# Patient Record
Sex: MALE | Race: BLACK OR AFRICAN AMERICAN | URBAN - METROPOLITAN AREA
[De-identification: names, ages, dates, MRNs, and addresses within clinical notes are randomized per-mention and may not be internally consistent; named-entity substitution may affect disease eponyms.]

---

## 2017-03-08 ENCOUNTER — OFFICE VISIT (OUTPATIENT)
Dept: FAMILY MEDICINE | Facility: CLINIC | Age: 47
End: 2017-03-08
Payer: COMMERCIAL

## 2017-03-08 VITALS — DIASTOLIC BLOOD PRESSURE: 70 MMHG | SYSTOLIC BLOOD PRESSURE: 110 MMHG | TEMPERATURE: 98.3 F

## 2017-03-08 DIAGNOSIS — Z71.84 TRAVEL ADVICE ENCOUNTER: Primary | ICD-10-CM

## 2017-03-08 DIAGNOSIS — Z23 NEED FOR VACCINATION: ICD-10-CM

## 2017-03-08 PROCEDURE — 90715 TDAP VACCINE 7 YRS/> IM: CPT | Performed by: NURSE PRACTITIONER

## 2017-03-08 PROCEDURE — 90686 IIV4 VACC NO PRSV 0.5 ML IM: CPT | Performed by: NURSE PRACTITIONER

## 2017-03-08 PROCEDURE — 90472 IMMUNIZATION ADMIN EACH ADD: CPT | Performed by: NURSE PRACTITIONER

## 2017-03-08 PROCEDURE — 99402 PREV MED CNSL INDIV APPRX 30: CPT | Mod: 25 | Performed by: NURSE PRACTITIONER

## 2017-03-08 PROCEDURE — 90471 IMMUNIZATION ADMIN: CPT | Performed by: NURSE PRACTITIONER

## 2017-03-08 RX ORDER — CIPROFLOXACIN 500 MG/1
500 TABLET, FILM COATED ORAL 2 TIMES DAILY
Qty: 6 TABLET | Refills: 0 | Status: SHIPPED | OUTPATIENT
Start: 2017-03-08 | End: 2017-10-17

## 2017-03-08 RX ORDER — LORAZEPAM 0.5 MG/1
0.25-0.5 TABLET ORAL EVERY 8 HOURS PRN
Qty: 8 TABLET | Refills: 0 | Status: SHIPPED | OUTPATIENT
Start: 2017-03-08

## 2017-03-08 RX ORDER — ATOVAQUONE AND PROGUANIL HYDROCHLORIDE 250; 100 MG/1; MG/1
1 TABLET, FILM COATED ORAL DAILY
Qty: 30 TABLET | Refills: 0 | Status: SHIPPED | OUTPATIENT
Start: 2017-03-08

## 2017-03-08 NOTE — PROGRESS NOTES
Nurse Note      Itinerary:  [facility]      Departure Date: 3/25/17      Return Date: 4/10/17      Length of Trip 3 weeks      Reason for Travel: Business           Urban or rural: both      Accommodations: Hotel        IMMUNIZATION HISTORY  Have you received any immunizations within the past 4 weeks?  No  Have you ever fainted from having your blood drawn or from an injection?  No  Have you ever had a fever reaction to vaccination?  No  Have you ever had any bad reaction or side effect from any vaccination?  No  Have you ever had hepatitis A or B vaccine?  Yes  Do you live (or work closely) with anyone who has AIDS, an AIDS-like condition, any other immune disorder or who is on chemotherapy for cancer?  No  Do you have a family history of immunodeficiency?  No  Have you received any injection of immune globulin or any blood products during the past 12 months?  No    Patient roomed by Willi Byrne  Tez Moore is a 46 year old male seen today with spouse for counsultation for international travel to [facility] for Business.  Patient will be departing in  2 week(s) and staying for   2 week(s) and  traveling alone.      Patient itinerary :  will be in the urban region [facility] which presents risk for Malaria, Yellow Fever, Dengue Fever, Chikungungya,  Trypanosomiasis, Schistosomiasis, Rabies, Ebola, food borne illnesses, motor vehicle accidents, Typhoid, Leishmaniasis and Lassa Fever. exposure.      Patient's activities will include visiting friends and relatives and business.    Patient[place] birth is [facility] [place]    Special medical concerns: leukopenia ( genetic) flight anxiety  Pre-travel questionnaire was completed by patient and reviewed by provider.     Vitals: /70  Temp 98.3  F (36.8  C) (Oral)  BMI= There is no height or weight on file to calculate BMI.    EXAM:  General:  Well-nourished, well-developed in no acute distress.  Appears to be stated age,  interacts appropriately and expresses understanding of information given to patient.    Current Outpatient Prescriptions   Medication Sig Dispense Refill     order for DME Equipment being ordered: knee hinged brace [id number] 1 each 0     diclofenac (VOLTAREN) 75 MG EC tablet Take 1 tablet (75 mg) by mouth 2 times daily as needed for moderate pain 60 tablet 1     NO ACTIVE MEDICATIONS        Patient Active Problem List   Diagnosis     CARDIOVASCULAR SCREENING; LDL GOAL LESS THAN 160     Umbilical hernia     Leukopenia     Positive PPD, treated     No Known Allergies      Immunizations discussed include:   Hepatitis A:  Up to date  Hepatitis B: Up to date per report  Influenza: Ordered/given today, risks, benefits and side effects reviewed  Typhoid: ordered oral  Rabies: Declined  Not concerned about risk of disease  reviewed managment of a animal bite or scratch (washing wound, seek medical care within 24 hours for post exposure prophylaxis )  Yellow Fever: Up to date  Japanese Encephalitis: Not indicated  Meningococcus: Not indicated  Tetanus/Diphtheria: Ordered/given today, risks, benefits and side effects reviewed  Measles/Mumps/Rubella: Up to date  Cholera: Not needed  Polio: Up to date  Pneumococcal: Under age of 65  Varicella: Immune by disease history per patient report  Zostavax:  Not indicated  HPV:  Not indicated  TB:  Previously positive    Altitude Exposure on this trip: no    ASSESSMENT/PLAN:    ICD-10-CM    1. Travel advice encounter Z71.89 typhoid (VIVOTIF) CR capsule     atovaquone-proguanil (MALARONE) 250-100 MG per tablet     HC FLU VAC PRESRV FREE QUAD SPLIT VIR 3+YRS IM     TDAP (ADACEL AGES 11-64)     ciprofloxacin (CIPRO) 500 MG tablet     LORazepam (ATIVAN) 0.5 MG tablet   2. Need for vaccination Z23 typhoid (VIVOTIF) CR capsule     atovaquone-proguanil (MALARONE) 250-100 MG per tablet     HC FLU VAC PRESRV FREE QUAD SPLIT VIR 3+YRS IM     TDAP (ADACEL AGES 11-64)     LORazepam (ATIVAN) 0.5 MG  tablet     I have reviewed general recommendations for safe travel   including: food/water precautions, insect precautions, roadway safety. Educational materials and Travax report provided.    Malaraia prophylaxis recommended: Malarone  Symptomatic treatment for traveler's diarrhea: ciprofloxacin  Altitude illness prevention and treatment: no  For flight anxiety: Ativan #8        Evacuation insurance advised and resources were provided to patient.    Total visit time 30 minutes  with over 50% of time spent counseling patient as detailed above.    Bonny Lozano CNP

## 2017-03-08 NOTE — PATIENT INSTRUCTIONS
Today March 8, 2017 you received the    Flu Vaccine    Tetanus (Tdap) Vaccine    Typhoid - Oral. This prescription has been faxed to your pharmacy, please take as directed.   .    These appointments can be made as a NURSE ONLY visit.    **It is very important for the vaccinations to be given on the scheduled day(s), this helps ensure you receive the full effectiveness of the vaccine.**    Please call [facility] with any questions [phone]    Thank you for visiting [facility]

## 2017-03-08 NOTE — NURSING NOTE
"Chief Complaint   Patient presents with     Travel Clinic     initial /70  Temp 98.3  F (36.8  C) (Oral) Estimated body mass index is 26.39 kg/(m^2) as calculated from the following:    Height as of 6/1/15: 5' 11.25\" (1.81 m).    Weight as of 6/1/15: 190 lb 9 oz (86.4 kg).  BP completed using cuff size: large.   R arm      Health Maintenance that is potentially due pending provider review:  NONE    n/a    Willi Gray ma  "

## 2017-03-08 NOTE — MR AVS SNAPSHOT
After Visit Summary   3/8/2017    Tez Moore    MRN: [id number]           Patient Information     Date Of Birth          1970        Visit Information        Provider Department      3/8/2017 11:45 AM Bonny Lozano APRN [facility]        Today's Diagnoses     Travel advice encounter    -  1    Need for vaccination          Care Instructions    Today March 8, 2017 you received the    Flu Vaccine    Tetanus (Tdap) Vaccine    Typhoid - Oral. This prescription has been faxed to your pharmacy, please take as directed.   .    These appointments can be made as a NURSE ONLY visit.    **It is very important for the vaccinations to be given on the scheduled day(s), this helps ensure you receive the full effectiveness of the vaccine.**    Please call [facility] with any questions [phone]    Thank you for visiting [facility]            Follow-ups after your visit        Who to contact     If you have questions or need follow up information about today's clinic visit or your schedule please contact [facility] directly at [phone].  Normal or non-critical lab and imaging results will be communicated to you by [email or web address], letter or phone within 4 business days after the clinic has received the results. If you do not hear from us within 7 days, please contact the clinic through [email or web address] or phone. If you have a critical or abnormal lab result, we will notify you by phone as soon as possible.  Submit refill requests through [employer] or call your pharmacy and they will forward the refill request to us. Please allow 3 business days for your refill to be completed.          Additional Information About Your Visit        MyChart Information     [employer] gives you secure access to your electronic health record. If you see a primary care provider, you can also send messages to your care team and make appointments. If you have questions,  please call your primary care clinic.  If you do not have a primary care provider, please call [phone] and they will assist you.        Care EveryWhere ID     This is your Care EveryWhere ID. This could be used by other organizations to access your [place] medical records  [id number]        Your Vitals Were     Temperature                   98.3  F (36.8  C) (Oral)            Blood Pressure from Last 3 Encounters:   03/08/17 110/70   06/01/15 113/78   03/20/13 139/84    Weight from Last 3 Encounters:   06/01/15 190 lb 9 oz (86.4 kg)   03/20/13 180 lb (81.6 kg)   04/19/11 171 lb 6.4 oz (77.7 kg)              We Performed the Following     HC FLU VAC PRESRV FREE QUAD SPLIT VIR 3+YRS IM     TDAP (ADACEL AGES 11-64)          Today's Medication Changes          These changes are accurate as of: 3/8/17 12:00 PM.  If you have any questions, ask your nurse or doctor.               Start taking these medicines.        Dose/Directions    atovaquone-proguanil 250-100 MG per tablet   Commonly known as:  MALARONE   Used for:  Need for vaccination, Travel advice encounter   Started by:  Bonny Lozano APRN CNP        Dose:  1 tablet   Take 1 tablet by mouth daily Start 2 days before exposure to Malaria and continue daily till  7 days after exposure.   Quantity:  30 tablet   Refills:  0       ciprofloxacin 500 MG tablet   Commonly known as:  CIPRO   Used for:  Travel advice encounter   Started by:  Bonny Lozano APRN CNP        Dose:  500 mg   Take 1 tablet (500 mg) by mouth 2 times daily Take 1 tablet two times a day for up to 3 days for severe diarrhea during travel.   Quantity:  6 tablet   Refills:  0       LORazepam 0.5 MG tablet   Commonly known as:  ATIVAN   Used for:  Need for vaccination, Travel advice encounter   Started by:  Bonny Lozano APRN CNP        Dose:  0.25-0.5 mg   Take 0.5-1 tablets (0.25-0.5 mg) by mouth every 8 hours as needed for anxiety Take 30 minutes prior to departure.  Do not  operate a vehicle after taking this medication   Quantity:  8 tablet   Refills:  0       typhoid CR capsule   Commonly known as:  VIVOTIF   Used for:  Need for vaccination, Travel advice encounter   Started by:  Bonny Lozano APRN CNP        Dose:  1 capsule   Take 1 capsule by mouth every other day   Quantity:  4 capsule   Refills:  0            Where to get your medicines      These medications were sent to [place] [address] [facility] - [place], [place] - [address]  [address], [facility] [address]     Phone:  [phone]     atovaquone-proguanil 250-100 MG per tablet    ciprofloxacin 500 MG tablet    typhoid CR capsule         Some of these will need a paper prescription and others can be bought over the counter.  Ask your nurse if you have questions.     Bring a paper prescription for each of these medications     LORazepam 0.5 MG tablet                Primary Care Provider Office Phone # Fax #    Carlos Sampson [phone]       [facility] [address] [address]        Thank you!     Thank you for choosing [facility]  for your care. Our goal is always to provide you with excellent care. Hearing back from our patients is one way we can continue to improve our services. Please take a few minutes to complete the written survey that you may receive in the mail after your visit with us. Thank you!             Your Updated Medication List - Protect others around you: Learn how to safely use, store and throw away your medicines at www.disposemymeds.org.          This list is accurate as of: 3/8/17 12:00 PM.  Always use your most recent med list.                   Brand Name Dispense Instructions for use    atovaquone-proguanil 250-100 MG per tablet    MALARONE    30 tablet    Take 1 tablet by mouth daily Start 2 days before exposure to Malaria and continue daily till  7 days after exposure.       ciprofloxacin 500 MG tablet    CIPRO    6  tablet    Take 1 tablet (500 mg) by mouth 2 times daily Take 1 tablet two times a day for up to 3 days for severe diarrhea during travel.       diclofenac 75 MG EC tablet    VOLTAREN    60 tablet    Take 1 tablet (75 mg) by mouth 2 times daily as needed for moderate pain       LORazepam 0.5 MG tablet    ATIVAN    8 tablet    Take 0.5-1 tablets (0.25-0.5 mg) by mouth every 8 hours as needed for anxiety Take 30 minutes prior to departure.  Do not operate a vehicle after taking this medication       NO ACTIVE MEDICATIONS          order for DME     1 each    Equipment being ordered: knee hinged brace [id number]       typhoid CR capsule    VIVOTIF    4 capsule    Take 1 capsule by mouth every other day

## 2017-10-13 NOTE — PROGRESS NOTES
"  SUBJECTIVE:   Tez Moore is a 47 year old male who presents to clinic today for the following health issues:        Patient has tested positive TB and is here for chest x-ray. Patient travel out the country in April of this year for 17 days. The patient has a positive TB screening test due to previous BCG administration. He currently works at a school and requiring a chest x-ray to rule out active tuberculosis. He currently has no fever, cough, night sweats, weight loss or other pulmonary symptoms.    He apparently has tested positive numerous times for tuberculosis with a positive PPD. No prior treatment apparently noted.        Problem list and histories reviewed & adjusted, as indicated.  Additional history: as documented        Reviewed and updated as needed this visit by clinical staff       Reviewed and updated as needed this visit by Provider         ROS:   ROS: 10 point ROS neg or unchanged other than the symptoms noted above in the HPI. Overall feels well.      OBJECTIVE:                                                    /72  Pulse 95  Temp 99.3  F (37.4  C) (Temporal)  Ht 5' 11\" (1.803 m)  Wt 189 lb (85.7 kg)  SpO2 98%  BMI 26.36 kg/m2  Body mass index is 26.36 kg/(m^2).  GENERAL: healthy, alert and no distress  HENT: ear canals- normal; TMs- normal; Nose- normal; Mouth- no ulcers, no lesions  NECK: no tenderness, no adenopathy, no asymmetry, no masses, no stiffness; thyroid- normal to palpation  RESP: lungs clear to auscultation - no rales, no rhonchi, no wheezes  CV: regular rates and rhythm, normal S1 S2, no S3 or S4 and no murmur, no click or rub -  ABDOMEN: soft, no tenderness, no  hepatosplenomegaly, no masses, normal bowel sounds  MS: extremities- no gross deformities noted, no edema  SKIN: no suspicious lesions, no rashes  NEURO: strength and tone- normal, sensory exam- grossly normal, mentation- intact, speech- normal, reflexes- symmetric  PSYCH: Alert and oriented times 3; " speech- coherent , normal rate and volume; able to articulate logical thoughts, able to abstract reason, no tangential thoughts, no hallucinations or delusions, affect- normal    Diagnostic test results:  Diagnostic Test Results:  Chest x-ray is obtained. Radiologist are pending but appears normal.       ASSESSMENT/PLAN:                                                    1. Positive TB test   Noted by history. Chest x-ray obtained for work clearance.  - XR Chest 2 Views; Future    2. History of BCG vaccination  History of BCG vaccination in [facility].      Follow up with Provider - Follow-up as needed.   See Patient Instructions    The patient understood the rational for the diagnosis and treatment plan. All questions were answered to best of my ability and the patient's satisfaction.    Note: Chart documentation done in part with Dragon Voice Recognition software. Although reviewed after completion, some word and grammatical errors may remain.  Please consider this when interpreting information in this chart.      Carlos Sampson MD  [facility]  Answers for HPI/ROS submitted by the patient on 10/17/2017   Chronic problems general questions HPI Form  Diet:: Regular (no restrictions)  Frequency of exercise:: None  Taking medications regularly:: Yes  Medication side effects:: Not applicable  Additional concerns today:: No  PHQ-2 Score: 2

## 2017-10-17 ENCOUNTER — RADIANT APPOINTMENT (OUTPATIENT)
Dept: GENERAL RADIOLOGY | Facility: CLINIC | Age: 47
End: 2017-10-17
Attending: FAMILY MEDICINE
Payer: COMMERCIAL

## 2017-10-17 ENCOUNTER — OFFICE VISIT (OUTPATIENT)
Dept: FAMILY MEDICINE | Facility: CLINIC | Age: 47
End: 2017-10-17
Payer: COMMERCIAL

## 2017-10-17 VITALS
BODY MASS INDEX: 26.46 KG/M2 | TEMPERATURE: 99.3 F | HEART RATE: 95 BPM | DIASTOLIC BLOOD PRESSURE: 72 MMHG | HEIGHT: 71 IN | OXYGEN SATURATION: 98 % | SYSTOLIC BLOOD PRESSURE: 108 MMHG | WEIGHT: 189 LBS

## 2017-10-17 DIAGNOSIS — R76.11 POSITIVE TB TEST: ICD-10-CM

## 2017-10-17 DIAGNOSIS — R76.11 POSITIVE TB TEST: Primary | ICD-10-CM

## 2017-10-17 DIAGNOSIS — Z92.29 HISTORY OF BCG VACCINATION: ICD-10-CM

## 2017-10-17 PROCEDURE — 99213 OFFICE O/P EST LOW 20 MIN: CPT | Performed by: FAMILY MEDICINE

## 2017-10-17 PROCEDURE — 71020 XR CHEST 2 VW: CPT

## 2017-10-17 ASSESSMENT — PAIN SCALES - GENERAL: PAINLEVEL: NO PAIN (0)

## 2017-10-17 NOTE — NURSING NOTE
"Chief Complaint   Patient presents with     positive tb     Panel Management     lipid screening, flu shot       Initial /72  Pulse 95  Temp 99.3  F (37.4  C) (Temporal)  Ht 5' 11\" (1.803 m)  Wt 189 lb (85.7 kg)  SpO2 98%  BMI 26.36 kg/m2 Estimated body mass index is 26.36 kg/(m^2) as calculated from the following:    Height as of this encounter: 5' 11\" (1.803 m).    Weight as of this encounter: 189 lb (85.7 kg).  Medication Reconciliation: complete     Jeffery King MA    "

## 2017-10-17 NOTE — MR AVS SNAPSHOT
"              After Visit Summary   10/17/2017    Tez Moore    MRN: [id number]           Patient Information     Date Of Birth          1970        Visit Information        Provider Department      10/17/2017 1:00 PM Carlos Sampson MD [facility]        Today's Diagnoses     Positive TB test    -  1    History of BCG vaccination           Follow-ups after your visit        Who to contact     If you have questions or need follow up information about today's clinic visit or your schedule please contact [facility] directly at [phone].  Normal or non-critical lab and imaging results will be communicated to you by AudienceViewhart, letter or phone within 4 business days after the clinic has received the results. If you do not hear from us within 7 days, please contact the clinic through [employer] or phone. If you have a critical or abnormal lab result, we will notify you by phone as soon as possible.  Submit refill requests through [email or web address] or call your pharmacy and they will forward the refill request to us. Please allow 3 business days for your refill to be completed.          Additional Information About Your Visit        MyChart Information     [email or web address] gives you secure access to your electronic health record. If you see a primary care provider, you can also send messages to your care team and make appointments. If you have questions, please call your primary care clinic.  If you do not have a primary care provider, please call [phone] and they will assist you.        Care EveryWhere ID     This is your Care EveryWhere ID. This could be used by other organizations to access your [place] medical records  [id number]        Your Vitals Were     Pulse Temperature Height Pulse Oximetry BMI (Body Mass Index)       95 99.3  F (37.4  C) (Temporal) 5' 11\" (1.803 m) 98% 26.36 kg/m2        Blood Pressure from Last 3 Encounters:   10/17/17 108/72   03/08/17 110/70   06/01/15 113/78    " Weight from Last 3 Encounters:   10/17/17 189 lb (85.7 kg)   06/01/15 190 lb 9 oz (86.4 kg)   03/20/13 180 lb (81.6 kg)               Primary Care Provider    None Specified       No primary provider on file.        Equal Access to Services     BING SALAS : Hadii aad ku hadtaryn Sobrien, waaxda luqadaha, qaybta kaalmada adeegyada, favio emily eugeneeddie garcia vandana megan carney. So [facility] [phone].    ATENCIÓN: Si habla español, tiene a poe disposición servicios gratuitos de asistencia lingüística. Llame al [phone].    We comply with applicable federal civil rights laws and [place] laws. We do not discriminate on the basis of race, color, national origin, age, disability, sex, sexual orientation, or gender identity.            Thank you!     Thank you for choosing [facility]  for your care. Our goal is always to provide you with excellent care. Hearing back from our patients is one way we can continue to improve our services. Please take a few minutes to complete the written survey that you may receive in the mail after your visit with us. Thank you!             Your Updated Medication List - Protect others around you: Learn how to safely use, store and throw away your medicines at www.disposemymeds.org.          This list is accurate as of: 10/17/17  2:48 PM.  Always use your most recent med list.                   Brand Name Dispense Instructions for use Diagnosis    atovaquone-proguanil 250-100 MG per tablet    MALARONE    30 tablet    Take 1 tablet by mouth daily Start 2 days before exposure to Malaria and continue daily till  7 days after exposure.    Need for vaccination, Travel advice encounter       diclofenac 75 MG EC tablet    VOLTAREN    60 tablet    Take 1 tablet (75 mg) by mouth 2 times daily as needed for moderate pain    Injury, other and unspecified, knee, leg, ankle, and foot, Sprain of medial collateral ligament of knee, left, initial encounter       LORazepam 0.5 MG tablet     ATIVAN    8 tablet    Take 0.5-1 tablets (0.25-0.5 mg) by mouth every 8 hours as needed for anxiety Take 30 minutes prior to departure.  Do not operate a vehicle after taking this medication    Need for vaccination, Travel advice encounter       NO ACTIVE MEDICATIONS           order for DME     1 each    Equipment being ordered: knee hinged brace [id number]    Sprain of medial collateral ligament of knee, left, initial encounter

## 2017-10-18 ENCOUNTER — TELEPHONE (OUTPATIENT)
Dept: FAMILY MEDICINE | Facility: CLINIC | Age: 47
End: 2017-10-18

## 2017-10-18 NOTE — LETTER
October 18, 2017      Tez Moore  [address] [address]        Dear Tez,    Enclosed is a copy of the chest xray for your employer.        Sincerely,        Carlos Sampson MD

## 2017-10-18 NOTE — TELEPHONE ENCOUNTER
Please inform that the chest xray is normal and negative. A copy of the report is being sent in the mail. The report will also be available through My Chart.    Carlos Sampson MD  Please close encounter when call/work completed.

## 2020-02-23 ENCOUNTER — HEALTH MAINTENANCE LETTER (OUTPATIENT)
Age: 50
End: 2020-02-23

## 2020-12-06 ENCOUNTER — HEALTH MAINTENANCE LETTER (OUTPATIENT)
Age: 50
End: 2020-12-06

## 2021-04-11 ENCOUNTER — HEALTH MAINTENANCE LETTER (OUTPATIENT)
Age: 51
End: 2021-04-11

## 2021-09-26 ENCOUNTER — HEALTH MAINTENANCE LETTER (OUTPATIENT)
Age: 51
End: 2021-09-26

## 2021-12-17 ENCOUNTER — TELEPHONE (OUTPATIENT)
Dept: FAMILY MEDICINE | Facility: CLINIC | Age: 51
End: 2021-12-17
Payer: COMMERCIAL

## 2022-05-07 ENCOUNTER — HEALTH MAINTENANCE LETTER (OUTPATIENT)
Age: 52
End: 2022-05-07

## 2023-04-22 ENCOUNTER — HEALTH MAINTENANCE LETTER (OUTPATIENT)
Age: 53
End: 2023-04-22

## 2023-06-02 ENCOUNTER — HEALTH MAINTENANCE LETTER (OUTPATIENT)
Age: 53
End: 2023-06-02